# Patient Record
Sex: FEMALE | Race: WHITE | NOT HISPANIC OR LATINO | URBAN - METROPOLITAN AREA
[De-identification: names, ages, dates, MRNs, and addresses within clinical notes are randomized per-mention and may not be internally consistent; named-entity substitution may affect disease eponyms.]

---

## 2023-02-18 PROBLEM — C50.111 MALIGNANT NEOPLASM OF CENTRAL PORTION OF RIGHT BREAST IN FEMALE, ESTROGEN RECEPTOR NEGATIVE: Status: ACTIVE | Noted: 2021-08-23

## 2023-02-18 PROBLEM — Z17.1 MALIGNANT NEOPLASM OF CENTRAL PORTION OF RIGHT BREAST IN FEMALE, ESTROGEN RECEPTOR NEGATIVE: Status: ACTIVE | Noted: 2021-08-23

## 2023-02-18 NOTE — PROGRESS NOTES
Breast Surgery Clinic Note    Chief Complaint:   Bernadette Longoria is a 82 y.o. female presenting today for her three month follow-up of her breast cancer issues. She is due for a physical examination and mammogram. (# 2 of 3) She has had no issues in the interim.    History of Present Illness:   Mrs. 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 One SLN was negative prior to neoadjuvant chemotherapy. She completed neoadjuvant chemotherapy. 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  Margins for the invasive component were widely negative.  DCIS margins were 0.1 mm superiorly.  ypT2N0. She completed adjuvant radiation.  MD::: Paul Villavicencio MD; Paul Mueller MD; Dior Duong MD; Alberto Fuentes MD; Aurelia Gomes MD    Past Medical History:   Diagnosis Date    Anxiety     Hypercholesteremia     Hypertension     Hypothyroidism     Insulin resistance     Malignant neoplasm of central portion of right breast in female, estrogen receptor negative 08/23/2021    Last Assessment & Plan:  Formatting of this note might be different from the original. History & Physical Undergoing neoadjuvant chemotherapy with Dr. Fuentes as noted above.  Her last round was on 11/10/2021 a full dose cyclophosphamide, Taxotere and Keytruda. Discharge Summary   Follow-up Follow-up with Dr. Fuentes.    Osteopenia 12/14/2020    [facility]      Past Surgical History:   Procedure Laterality Date    BREAST BIOPSY Right 08/10/2021    invasive carcinoma    BREAST BIOPSY Left     BREAST BIOPSY Right     X 2    CARDIAC CATHETERIZATION  05/21/2014    LUMPECTOMY, BREAST Right 01/20/2022    ROTATOR CUFF REPAIR      sentinal node biopsy & port placement  09/02/2021    TOTAL ABDOMINAL HYSTERECTOMY W/ BILATERAL SALPINGOOPHORECTOMY  1983        Current Outpatient Medications:     ALPRAZolam (XANAX) 0.25 MG tablet, TAKE ONE  "TABLET BY MOUTH NIGHTLY AS NEEDED for sleep THANK YOU, Disp: , Rfl:     aspirin (ECOTRIN) 81 MG EC tablet, TAKE ONE TABLET BY MOUTH ONCE DAILY THANK YOU, Disp: , Rfl:     EScitalopram oxalate (LEXAPRO) 10 MG tablet, TAKE ONE TABLET BY MOUTH IN THE MORNING THANK YOU, Disp: , Rfl:     furosemide (LASIX) 20 MG tablet, TAKE ONE TABLET BY MOUTH ONCE DAILY AS NEEDED FOR SWELLING THANK YOU, Disp: , Rfl:     gabapentin (NEURONTIN) 300 MG capsule, TAKE ONE CAPSULE BY MOUTH THREE TIMES DAILY THANK YOU, Disp: , Rfl:     levothyroxine (SYNTHROID) 88 MCG tablet, TAKE ONE TABLET BY MOUTH ONCE DAILY THANK YOU, Disp: , Rfl:     lisinopriL (PRINIVIL,ZESTRIL) 2.5 MG tablet, Take 2.5 mg by mouth., Disp: , Rfl:     meloxicam (MOBIC) 7.5 MG tablet, TAKE ONE TABLET BY MOUTH EVERY MORNING WITH FOOD AS NEEDED FOR JOINT STIFFNESS, Disp: , Rfl:     midodrine (PROAMATINE) 2.5 MG Tab, TAKE ONE TABLET BY MOUTH THREE TIMES DAILY THANK YOU, Disp: , Rfl:     rosuvastatin (CRESTOR) 10 MG tablet, TAKE ONE TABLET BY MOUTH ONCE IN THE EVENING THANK YOU, Disp: , Rfl:     tamsulosin (FLOMAX) 0.4 mg Cap, TAKE ONE CAPSULE BY MOUTH DAILY THANK YOU, Disp: , Rfl:     traMADoL (ULTRAM) 50 mg tablet, TAKE ONE TABLET BY MOUTH TWICE DAILY AS NEEDED FOR PAIN THANK YOU, Disp: , Rfl:     valsartan (DIOVAN) 40 MG tablet, TAKE ONE TABLET BY MOUTH ONCE DAILY THANK YOU, Disp: , Rfl:    Review of patient's allergies indicates:   Allergen Reactions    Prednisone Other (See Comments)     Numbness in Mouth     Hydrocodone-homatropine     Nabumetone     Penicillins      "didn't dissolve under the skin" over 60yrs ago      Propoxyphene Other (See Comments)    Sulfa (sulfonamide antibiotics)       Social History     Tobacco Use    Smoking status: Never    Smokeless tobacco: Never   Substance Use Topics    Alcohol use: Yes      Family History   Problem Relation Age of Onset    Lung cancer Mother     Diabetes Father       Review of Systems   Genitourinary:  Negative for hot " flashes.   Integumentary:  Negative for color change, rash, mole/lesion, breast mass, breast discharge and breast tenderness.   Breast: Negative for mass and tenderness   Physical Exam  Exam conducted with a chaperone present.   Constitutional:       Appearance: Normal appearance.   HENT:      Head: Normocephalic.      Nose: Nose normal.   Eyes:      Conjunctiva/sclera: Conjunctivae normal.   Cardiovascular:      Rate and Rhythm: Normal rate and regular rhythm.   Pulmonary:      Effort: Pulmonary effort is normal.   Chest:   Breasts:     Right: Normal. No swelling, mass, nipple discharge or skin change.      Left: No swelling, mass, nipple discharge or skin change.   Abdominal:      General: Abdomen is flat.      Palpations: Abdomen is soft.   Musculoskeletal:      Cervical back: Normal range of motion and neck supple.   Lymphadenopathy:      Cervical: No cervical adenopathy.      Right cervical: No deep or posterior cervical adenopathy.     Left cervical: No superficial, deep or posterior cervical adenopathy.      Upper Body:      Right upper body: No supraclavicular or axillary adenopathy.      Left upper body: No supraclavicular or axillary adenopathy.   Skin:     General: Skin is warm.   Neurological:      Mental Status: She is alert and oriented to person, place, and time.   Psychiatric:         Mood and Affect: Mood normal.      MAMMOGRAM REPORT: She has some diffuse fibronodular tissue bilaterally; there are no spiculated lesions, distortions or suspicious calcifications noted; NEM    1. Malignant neoplasm of central portion of right breast in female, estrogen receptor negative    She is doing well from a cancer standpoint and has no worrisome findings today.  She will continue to be followed according to NCCN Guidelines.     2. Screening breast examination     She understands that her imaging and exams have remained stable and is comfortable being followed in a conservative fashion. She understands the  importance of monthly self-breast examination and knows to report any and all changes as they occur.     Medical Decision Making:    t is my impression that the patient suffers from all the listed conditions.  Each of these conditions did affect my Plan of Care and all medical decisions that were rendered.  The medical decision making was of high difficulty based on her comorbidities and her previous diagnosis of Triple Negative breast cancer, which can lead to an increased recurrence rate and pose a direct threat to her life.  Laboratory evaluations are currently pending but will be reviewed in the next 24 hours. Any further recommendations will be communicated to the patient by me.  I have reviewed and verified her allergies, list of medications, medical and surgical histories, social history, and a pertinent review of symptoms.    Follow up:  3 months and prn (PE)

## 2023-02-24 DIAGNOSIS — C50.111 MALIGNANT NEOPLASM OF CENTRAL PORTION OF RIGHT BREAST IN FEMALE, ESTROGEN RECEPTOR NEGATIVE: Primary | ICD-10-CM

## 2023-02-24 DIAGNOSIS — Z17.1 MALIGNANT NEOPLASM OF CENTRAL PORTION OF RIGHT BREAST IN FEMALE, ESTROGEN RECEPTOR NEGATIVE: Primary | ICD-10-CM

## 2023-02-28 ENCOUNTER — OFFICE VISIT (OUTPATIENT)
Dept: SURGERY | Facility: CLINIC | Age: 83
End: 2023-02-28
Payer: MEDICARE

## 2023-02-28 VITALS — BODY MASS INDEX: 29.44 KG/M2 | WEIGHT: 160 LBS | HEIGHT: 62 IN

## 2023-02-28 DIAGNOSIS — C50.111 MALIGNANT NEOPLASM OF CENTRAL PORTION OF RIGHT BREAST IN FEMALE, ESTROGEN RECEPTOR NEGATIVE: Primary | ICD-10-CM

## 2023-02-28 DIAGNOSIS — Z12.39 SCREENING BREAST EXAMINATION: ICD-10-CM

## 2023-02-28 DIAGNOSIS — Z17.1 MALIGNANT NEOPLASM OF CENTRAL PORTION OF RIGHT BREAST IN FEMALE, ESTROGEN RECEPTOR NEGATIVE: Primary | ICD-10-CM

## 2023-02-28 PROCEDURE — 99214 OFFICE O/P EST MOD 30 MIN: CPT | Mod: S$GLB,,, | Performed by: SPECIALIST

## 2023-02-28 PROCEDURE — 85025 COMPLETE CBC W/AUTO DIFF WBC: CPT | Performed by: SPECIALIST

## 2023-02-28 PROCEDURE — 80053 COMPREHEN METABOLIC PANEL: CPT | Performed by: SPECIALIST

## 2023-02-28 PROCEDURE — 83615 LACTATE (LD) (LDH) ENZYME: CPT | Performed by: SPECIALIST

## 2023-02-28 PROCEDURE — 82378 CARCINOEMBRYONIC ANTIGEN: CPT | Performed by: SPECIALIST

## 2023-02-28 PROCEDURE — 99214 PR OFFICE/OUTPT VISIT, EST, LEVL IV, 30-39 MIN: ICD-10-PCS | Mod: S$GLB,,, | Performed by: SPECIALIST

## 2023-03-01 LAB
ALBUMIN SERPL BCP-MCNC: 4.2 G/DL (ref 3.5–5.2)
ALP SERPL-CCNC: 87 U/L (ref 55–135)
ALT SERPL W/O P-5'-P-CCNC: 12 U/L (ref 10–44)
ANION GAP SERPL CALC-SCNC: 14 MMOL/L (ref 8–16)
AST SERPL-CCNC: 21 U/L (ref 10–40)
BASOPHILS # BLD AUTO: 0.03 K/UL (ref 0–0.2)
BASOPHILS NFR BLD: 0.4 % (ref 0–1.9)
BILIRUB SERPL-MCNC: 1.2 MG/DL (ref 0.1–1)
BUN SERPL-MCNC: 34 MG/DL (ref 8–23)
CALCIUM SERPL-MCNC: 9.9 MG/DL (ref 8.7–10.5)
CEA SERPL-MCNC: 2.1 NG/ML (ref 0–5)
CHLORIDE SERPL-SCNC: 103 MMOL/L (ref 95–110)
CO2 SERPL-SCNC: 24 MMOL/L (ref 23–29)
CREAT SERPL-MCNC: 1.5 MG/DL (ref 0.5–1.4)
DIFFERENTIAL METHOD: ABNORMAL
EOSINOPHIL # BLD AUTO: 0 K/UL (ref 0–0.5)
EOSINOPHIL NFR BLD: 0.5 % (ref 0–8)
ERYTHROCYTE [DISTWIDTH] IN BLOOD BY AUTOMATED COUNT: 13.6 % (ref 11.5–14.5)
EST. GFR  (NO RACE VARIABLE): 35 ML/MIN/1.73 M^2
GLUCOSE SERPL-MCNC: 139 MG/DL (ref 70–110)
HCT VFR BLD AUTO: 37.7 % (ref 37–48.5)
HGB BLD-MCNC: 12.5 G/DL (ref 12–16)
IMM GRANULOCYTES # BLD AUTO: 0.01 K/UL (ref 0–0.04)
IMM GRANULOCYTES NFR BLD AUTO: 0.1 % (ref 0–0.5)
LDH SERPL L TO P-CCNC: 195 U/L (ref 110–260)
LYMPHOCYTES # BLD AUTO: 0.9 K/UL (ref 1–4.8)
LYMPHOCYTES NFR BLD: 12.1 % (ref 18–48)
MCH RBC QN AUTO: 31.2 PG (ref 27–31)
MCHC RBC AUTO-ENTMCNC: 33.2 G/DL (ref 32–36)
MCV RBC AUTO: 94 FL (ref 82–98)
MONOCYTES # BLD AUTO: 0.4 K/UL (ref 0.3–1)
MONOCYTES NFR BLD: 4.7 % (ref 4–15)
NEUTROPHILS # BLD AUTO: 6.3 K/UL (ref 1.8–7.7)
NEUTROPHILS NFR BLD: 82.2 % (ref 38–73)
NRBC BLD-RTO: 0 /100 WBC
PLATELET # BLD AUTO: 281 K/UL (ref 150–450)
PMV BLD AUTO: 10.8 FL (ref 9.2–12.9)
POTASSIUM SERPL-SCNC: 4.1 MMOL/L (ref 3.5–5.1)
PROT SERPL-MCNC: 7.5 G/DL (ref 6–8.4)
RBC # BLD AUTO: 4.01 M/UL (ref 4–5.4)
SODIUM SERPL-SCNC: 141 MMOL/L (ref 136–145)
WBC # BLD AUTO: 7.63 K/UL (ref 3.9–12.7)

## 2023-03-02 LAB — CANCER AG27-29 SERPL-ACNC: 22.1 U/ML

## 2023-05-19 NOTE — PROGRESS NOTES
[facility]  Paul Joiner MD, FACS  Shanel Christiansen, LISSETT-C      Chief Complaint:   Bernadette Longoria is a 82 y.o. female presenting today for her 3 month follow up to her breast cancer diagnosis.  She is due for a Physical Examination.  She reports no interval changes.     History of Present Illness:   Mrs. 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 One SLN was negative prior to neoadjuvant chemotherapy. She completed neoadjuvant chemotherapy. 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  Margins for the invasive component were widely negative.  DCIS margins were 0.1 mm superiorly.  ypT2N0. She completed adjuvant radiation.  MD::: Paul Villavicencio MD; Paul Mueller MD; Dior Duong MD; Alberto Fuentes MD; MD Eliseo    Past Medical History:   Diagnosis Date    Anxiety     Hypercholesteremia     Hypertension     Hypothyroidism     Insulin resistance     Malignant neoplasm of central portion of right breast in female, estrogen receptor negative 08/23/2021    Last Assessment & Plan:  Formatting of this note might be different from the original. History & Physical Undergoing neoadjuvant chemotherapy with Dr. Fuentes as noted above.  Her last round was on 11/10/2021 a full dose cyclophosphamide, Taxotere and Keytruda. Discharge Summary   Follow-up Follow-up with Dr. Fuentes.    Osteopenia 12/14/2020    [facility]      Past Surgical History:   Procedure Laterality Date    BREAST BIOPSY Right 08/10/2021    invasive carcinoma    BREAST BIOPSY Left     BREAST BIOPSY Right     X 2    CARDIAC CATHETERIZATION  05/21/2014    LUMPECTOMY, BREAST Right 01/20/2022    ROTATOR CUFF REPAIR      sentinal node biopsy & port placement  09/02/2021    TOTAL ABDOMINAL HYSTERECTOMY W/ BILATERAL SALPINGOOPHORECTOMY  1983        Current Outpatient Medications:     ALPRAZolam  "(XANAX) 0.25 MG tablet, TAKE ONE TABLET BY MOUTH NIGHTLY AS NEEDED for sleep THANK YOU, Disp: , Rfl:     aspirin (ECOTRIN) 81 MG EC tablet, TAKE ONE TABLET BY MOUTH ONCE DAILY THANK YOU, Disp: , Rfl:     EScitalopram oxalate (LEXAPRO) 10 MG tablet, TAKE ONE TABLET BY MOUTH IN THE MORNING THANK YOU, Disp: , Rfl:     furosemide (LASIX) 20 MG tablet, TAKE ONE TABLET BY MOUTH ONCE DAILY AS NEEDED FOR SWELLING THANK YOU, Disp: , Rfl:     gabapentin (NEURONTIN) 300 MG capsule, TAKE ONE CAPSULE BY MOUTH THREE TIMES DAILY THANK YOU, Disp: , Rfl:     levothyroxine (SYNTHROID) 88 MCG tablet, TAKE ONE TABLET BY MOUTH ONCE DAILY THANK YOU, Disp: , Rfl:     lisinopriL (PRINIVIL,ZESTRIL) 2.5 MG tablet, Take 2.5 mg by mouth., Disp: , Rfl:     meloxicam (MOBIC) 7.5 MG tablet, TAKE ONE TABLET BY MOUTH EVERY MORNING WITH FOOD AS NEEDED FOR JOINT STIFFNESS, Disp: , Rfl:     midodrine (PROAMATINE) 2.5 MG Tab, 10 mg., Disp: , Rfl:     rosuvastatin (CRESTOR) 10 MG tablet, TAKE ONE TABLET BY MOUTH ONCE IN THE EVENING THANK YOU, Disp: , Rfl:     tamsulosin (FLOMAX) 0.4 mg Cap, TAKE ONE CAPSULE BY MOUTH DAILY THANK YOU, Disp: , Rfl:     traMADoL (ULTRAM) 50 mg tablet, TAKE ONE TABLET BY MOUTH TWICE DAILY AS NEEDED FOR PAIN THANK YOU, Disp: , Rfl:     valsartan (DIOVAN) 40 MG tablet, TAKE ONE TABLET BY MOUTH ONCE DAILY THANK YOU, Disp: , Rfl:    Review of patient's allergies indicates:   Allergen Reactions    Prednisone Other (See Comments)     Numbness in Mouth     Hydrocodone-homatropine     Nabumetone     Penicillins      "didn't dissolve under the skin" over 60yrs ago      Propoxyphene Other (See Comments)    Sulfa (sulfonamide antibiotics)       Social History     Tobacco Use    Smoking status: Never    Smokeless tobacco: Never   Substance Use Topics    Alcohol use: Yes      Family History   Problem Relation Age of Onset    Lung cancer Mother     Diabetes Father         Review of Systems   Integumentary:  Negative for color change, " rash, mole/lesion, breast mass, breast discharge and breast tenderness.   Breast: Negative for mass and tenderness     Physical Exam   Constitutional: She is cooperative.   HENT:   Head: Normocephalic.   Pulmonary/Chest: She exhibits no mass. Right breast exhibits no inverted nipple, no mass, no nipple discharge, no skin change and no tenderness. Left breast exhibits no inverted nipple, no mass, no nipple discharge, no skin change and no tenderness.   Abdominal: Normal appearance.   Musculoskeletal: Lymphadenopathy:      Upper Body:      Right upper body: No supraclavicular or axillary adenopathy.      Left upper body: No supraclavicular or axillary adenopathy.     Neurological: She is alert.   Skin: No rash noted.        ASSESSMENT and PLAN of CARE    1. Malignant neoplasm of central portion of right breast in female, estrogen receptor negative  Assessment & Plan:   Patient is doing well and is being followed according to NCCN Guidelines. She understands that her imaging and exams have remained stable and is comfortable being followed in a conservative fashion. She understands the importance of monthly self-breast examination and knows to report any and all changes as they occur.    Labs obtained today: CBC, Chem 12, CEA, LDH, CA27/29 - after resulted, these will be compared to her previous values and all abnormal values will be phoned to her for discussion.        Medical Decision Making:  It is my impression that the patient suffers from all the listed conditions.  Each of these conditions did affect my Plan of Care and all medical decisions that were rendered.  The medical decision making was of high difficulty based on her comorbidities and her previous diagnosis of Triple Negative breast cancer, which can lead to an increased recurrence rate and pose a direct threat to her life.  Laboratory evaluations are currently pending but will be reviewed in the next 24 hours. Any further recommendations will be  communicated to the patient by me.  I have reviewed and verified her allergies, list of medications, medical and surgical histories, social history, and a pertinent review of symptoms.    Follow up:  3 Months and PRN     For:PEETHAN (VALERIA) at [facility], CXR, and LABS    No orders of the defined types were placed in this encounter.

## 2023-05-29 ENCOUNTER — OFFICE VISIT (OUTPATIENT)
Dept: SURGERY | Facility: CLINIC | Age: 83
End: 2023-05-29
Payer: MEDICARE

## 2023-05-29 DIAGNOSIS — Z17.1 MALIGNANT NEOPLASM OF CENTRAL PORTION OF RIGHT BREAST IN FEMALE, ESTROGEN RECEPTOR NEGATIVE: ICD-10-CM

## 2023-05-29 DIAGNOSIS — C50.111 MALIGNANT NEOPLASM OF CENTRAL PORTION OF RIGHT BREAST IN FEMALE, ESTROGEN RECEPTOR NEGATIVE: ICD-10-CM

## 2023-05-29 LAB
ALBUMIN SERPL BCP-MCNC: 4.1 G/DL (ref 3.5–5.2)
ALP SERPL-CCNC: 114 U/L (ref 55–135)
ALT SERPL W/O P-5'-P-CCNC: 12 U/L (ref 10–44)
ANION GAP SERPL CALC-SCNC: 16 MMOL/L (ref 8–16)
AST SERPL-CCNC: 23 U/L (ref 10–40)
BASOPHILS # BLD AUTO: 0.05 K/UL (ref 0–0.2)
BASOPHILS NFR BLD: 0.7 % (ref 0–1.9)
BILIRUB SERPL-MCNC: 1.3 MG/DL (ref 0.1–1)
BUN SERPL-MCNC: 33 MG/DL (ref 8–23)
CALCIUM SERPL-MCNC: 9.9 MG/DL (ref 8.7–10.5)
CEA SERPL-MCNC: 2.8 NG/ML (ref 0–5)
CHLORIDE SERPL-SCNC: 101 MMOL/L (ref 95–110)
CO2 SERPL-SCNC: 22 MMOL/L (ref 23–29)
CREAT SERPL-MCNC: 1.3 MG/DL (ref 0.5–1.4)
DIFFERENTIAL METHOD: ABNORMAL
EOSINOPHIL # BLD AUTO: 0.1 K/UL (ref 0–0.5)
EOSINOPHIL NFR BLD: 1.4 % (ref 0–8)
ERYTHROCYTE [DISTWIDTH] IN BLOOD BY AUTOMATED COUNT: 13.6 % (ref 11.5–14.5)
EST. GFR  (NO RACE VARIABLE): 41.3 ML/MIN/1.73 M^2
GLUCOSE SERPL-MCNC: 143 MG/DL (ref 70–110)
HCT VFR BLD AUTO: 44.8 % (ref 37–48.5)
HGB BLD-MCNC: 14.4 G/DL (ref 12–16)
IMM GRANULOCYTES # BLD AUTO: 0.05 K/UL (ref 0–0.04)
IMM GRANULOCYTES NFR BLD AUTO: 0.7 % (ref 0–0.5)
LDH SERPL L TO P-CCNC: 268 U/L (ref 110–260)
LYMPHOCYTES # BLD AUTO: 1.2 K/UL (ref 1–4.8)
LYMPHOCYTES NFR BLD: 16.7 % (ref 18–48)
MCH RBC QN AUTO: 30.3 PG (ref 27–31)
MCHC RBC AUTO-ENTMCNC: 32.1 G/DL (ref 32–36)
MCV RBC AUTO: 94 FL (ref 82–98)
MONOCYTES # BLD AUTO: 0.5 K/UL (ref 0.3–1)
MONOCYTES NFR BLD: 7.2 % (ref 4–15)
NEUTROPHILS # BLD AUTO: 5.2 K/UL (ref 1.8–7.7)
NEUTROPHILS NFR BLD: 73.3 % (ref 38–73)
NRBC BLD-RTO: 0 /100 WBC
PLATELET # BLD AUTO: 270 K/UL (ref 150–450)
PLATELET BLD QL SMEAR: ABNORMAL
PMV BLD AUTO: 11.4 FL (ref 9.2–12.9)
POTASSIUM SERPL-SCNC: 4.1 MMOL/L (ref 3.5–5.1)
PROT SERPL-MCNC: 7.7 G/DL (ref 6–8.4)
RBC # BLD AUTO: 4.76 M/UL (ref 4–5.4)
SODIUM SERPL-SCNC: 139 MMOL/L (ref 136–145)
WBC # BLD AUTO: 7.12 K/UL (ref 3.9–12.7)

## 2023-05-29 PROCEDURE — 99214 OFFICE O/P EST MOD 30 MIN: CPT | Mod: S$GLB,,, | Performed by: SPECIALIST

## 2023-05-29 PROCEDURE — 82378 CARCINOEMBRYONIC ANTIGEN: CPT | Performed by: SPECIALIST

## 2023-05-29 PROCEDURE — 99214 PR OFFICE/OUTPT VISIT, EST, LEVL IV, 30-39 MIN: ICD-10-PCS | Mod: S$GLB,,, | Performed by: SPECIALIST

## 2023-05-29 PROCEDURE — 83615 LACTATE (LD) (LDH) ENZYME: CPT | Performed by: SPECIALIST

## 2023-05-29 PROCEDURE — 85025 COMPLETE CBC W/AUTO DIFF WBC: CPT | Performed by: SPECIALIST

## 2023-05-29 PROCEDURE — 80053 COMPREHEN METABOLIC PANEL: CPT | Performed by: SPECIALIST

## 2023-05-29 RX ORDER — MIDODRINE HYDROCHLORIDE 5 MG/1
TABLET ORAL
COMMUNITY
Start: 2023-05-01

## 2023-05-29 RX ORDER — FLUTICASONE PROPIONATE 50 MCG
1 SPRAY, SUSPENSION (ML) NASAL EVERY MORNING
COMMUNITY
Start: 2023-05-10

## 2023-05-31 LAB — CANCER AG27-29 SERPL-ACNC: 21.4 U/ML

## 2023-08-18 DIAGNOSIS — C50.111 MALIGNANT NEOPLASM OF CENTRAL PORTION OF RIGHT BREAST IN FEMALE, ESTROGEN RECEPTOR NEGATIVE: Primary | ICD-10-CM

## 2023-08-18 DIAGNOSIS — Z17.1 MALIGNANT NEOPLASM OF CENTRAL PORTION OF RIGHT BREAST IN FEMALE, ESTROGEN RECEPTOR NEGATIVE: Primary | ICD-10-CM

## 2023-08-18 NOTE — PROGRESS NOTES
[facility]  MD Shanel Pritchard, NP-C    Chief Complaint:   Bernadette Longoria is a 82 y.o. female presenting today for  3 month follow up for her breast cancer issues. She is due for Physical Examination, Mammogram, and Chest Xray  She reports no interval changes on her self-breast examination.     History of Present Illness:   Mrs. 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 One SLN was negative prior to neoadjuvant chemotherapy. She completed neoadjuvant chemotherapy. 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  Margins for the invasive component were widely negative.  DCIS margins were 0.1 mm superiorly.  ypT2N0. She completed adjuvant radiation.  MD::: Paul Villavicencio MD; Paul Mueller MD;  Alberto Fuentes MD; MD Eliseo    Past Medical History:   Diagnosis Date    Abnormal chest x-ray 8/28/2023    Anxiety     Hypercholesteremia     Hypertension     Hypothyroidism     Insulin resistance     Malignant neoplasm of central portion of right breast in female, estrogen receptor negative 08/23/2021    Last Assessment & Plan:  Formatting of this note might be different from the original. History & Physical Undergoing neoadjuvant chemotherapy with Dr. Fuentes as noted above.  Her last round was on 11/10/2021 a full dose cyclophosphamide, Taxotere and Keytruda. Discharge Summary   Follow-up Follow-up with Dr. Fuentes.    Osteopenia 12/14/2020    [facility]      Past Surgical History:   Procedure Laterality Date    BREAST BIOPSY Left     BREAST BIOPSY Right     X 2    BREAST LUMPECTOMY Right 08/2021    CARDIAC CATHETERIZATION  05/21/2014    LUMPECTOMY, BREAST Right 01/20/2022    ROTATOR CUFF REPAIR      sentinal node biopsy & port placement  09/02/2021    TOTAL ABDOMINAL HYSTERECTOMY W/ BILATERAL SALPINGOOPHORECTOMY  1983        Current  "Outpatient Medications:     ALPRAZolam (XANAX) 0.25 MG tablet, TAKE ONE TABLET BY MOUTH NIGHTLY AS NEEDED for sleep THANK YOU, Disp: , Rfl:     aspirin (ECOTRIN) 81 MG EC tablet, TAKE ONE TABLET BY MOUTH ONCE DAILY THANK YOU, Disp: , Rfl:     EScitalopram oxalate (LEXAPRO) 10 MG tablet, TAKE ONE TABLET BY MOUTH IN THE MORNING THANK YOU, Disp: , Rfl:     fluticasone propionate (FLONASE) 50 mcg/actuation nasal spray, 1 spray by Each Nostril route every morning., Disp: , Rfl:     furosemide (LASIX) 20 MG tablet, TAKE ONE TABLET BY MOUTH ONCE DAILY AS NEEDED FOR SWELLING THANK YOU, Disp: , Rfl:     gabapentin (NEURONTIN) 300 MG capsule, TAKE ONE CAPSULE BY MOUTH THREE TIMES DAILY THANK YOU, Disp: , Rfl:     levothyroxine (SYNTHROID) 88 MCG tablet, TAKE ONE TABLET BY MOUTH ONCE DAILY THANK YOU, Disp: , Rfl:     lisinopriL (PRINIVIL,ZESTRIL) 2.5 MG tablet, Take 2.5 mg by mouth., Disp: , Rfl:     midodrine (PROAMATINE) 2.5 MG Tab, 10 mg., Disp: , Rfl:     midodrine (PROAMATINE) 5 MG Tab, Take by mouth., Disp: , Rfl:     rosuvastatin (CRESTOR) 10 MG tablet, TAKE ONE TABLET BY MOUTH ONCE IN THE EVENING THANK YOU, Disp: , Rfl:     tamsulosin (FLOMAX) 0.4 mg Cap, TAKE ONE CAPSULE BY MOUTH DAILY THANK YOU, Disp: , Rfl:     traMADoL (ULTRAM) 50 mg tablet, TAKE ONE TABLET BY MOUTH TWICE DAILY AS NEEDED FOR PAIN THANK YOU, Disp: , Rfl:     valsartan (DIOVAN) 40 MG tablet, TAKE ONE TABLET BY MOUTH ONCE DAILY THANK YOU, Disp: , Rfl:     meloxicam (MOBIC) 7.5 MG tablet, TAKE ONE TABLET BY MOUTH EVERY MORNING WITH FOOD AS NEEDED FOR JOINT STIFFNESS, Disp: , Rfl:    Review of patient's allergies indicates:   Allergen Reactions    Prednisone Other (See Comments)     Numbness in Mouth     Hydrocodone-homatropine     Nabumetone     Penicillins      "didn't dissolve under the skin" over 60yrs ago      Propoxyphene Other (See Comments)    Sulfa (sulfonamide antibiotics)       Social History     Tobacco Use    Smoking status: Never    " Smokeless tobacco: Never   Substance Use Topics    Alcohol use: Yes      Family History   Problem Relation Age of Onset    Lung cancer Mother     Diabetes Father         Review of Systems   Integumentary:  Negative for color change, rash, mole/lesion, breast mass, breast discharge and breast tenderness.   Breast: Negative for mass and tenderness       Physical Exam   Constitutional: She is cooperative.   HENT:   Head: Normocephalic.   Pulmonary/Chest: She exhibits no mass. Right breast exhibits no inverted nipple, no mass, no nipple discharge, no skin change and no tenderness. Left breast exhibits no inverted nipple, no mass, no nipple discharge, no skin change and no tenderness.   Abdominal: Normal appearance.   Musculoskeletal: Lymphadenopathy:      Upper Body:      Right upper body: No supraclavicular or axillary adenopathy.      Left upper body: No supraclavicular or axillary adenopathy.     Neurological: She is alert.   Skin: No rash noted.      MAMMOGRAM REPORT: The breasts have scattered areas of fibroglandular density. There is no evidence of suspicious masses, calcifications, or other abnormal findings.  Stable postoperative changes right breast. There is no mammographic evidence of malignancy.     CXR REPORT: There has been development of 2.4 cm nodular focus in the posterior left upper lobe. No pleural fluid. The cardiac silhouette is within normal size limits. The descending thoracic aorta is mildly tortuous. Stable superior endplate height loss of L1.  Impression: Development of a 2.4 cm nodular focus in the posterior left upper lobe, concerning for a pulmonary nodular metastasis. Chest CT with contrast is recommended for further assessment.    NOTE:::We viewed her films together at today's visit.  We discussed the multiple views obtained and the important findings.  Even benign changes were mentioned and her questions were answered.  She knows that she may receive a formal letter or report from the  Radiologist.  She is to contact us if she has questions.        Assessment/Plan  1. Malignant neoplasm of central portion of right breast in female, estrogen receptor negative  Assessment & Plan:  MAMMOGRAM REPORT: She has some diffuse fibronodular tissue; there are no spiculated lesions, distortions or suspicious calcifications noted; NEM    NOTE:::We viewed her films together at today's visit.  We discussed the multiple views obtained and the important findings.  Even benign changes were mentioned and her questions were answered.  She knows that she may receive a formal letter or report from the Radiologist.  She is to contact us if she has questions.        Orders:  -     Lactate Dehydrogenase  -     Comprehensive Metabolic Panel  -     CEA  -     CBC Auto Differential  -     Cancer Antigen 27-29    2. Abnormal chest x-ray  Assessment & Plan:  I will obtain a CT as recommended.              Medical Decision Making:  It is my impression that this patient suffers all conditions contained in this medical document.  Each of these conditions did affect our plan of care and my medical decision making today.  It is my opinion that the medical decision making concerning this patient was of moderate difficulty based on the aforementioned conditions.  Any further recommendations will be communicated to the patient by me.  I have reviewed and verified her allergies, list of medications, medical and surgical histories, social history, and a pertinent review of symptoms.      Follow up:  3 months and prn    For:  Physical Examination    Addendum 8/29/23 1122: CT Chest  Impression: a mass in the superior segment of the left lower lobe may represent a metastasis or possibly a primary lung carcinoma. Biopsy is recommended for further assessment. Dr. Fuentes has notified patient and ordered a CT guided biopsy.              9/20/2023 1:03 PM ADDENDUM:  CT scan of the chest did show a worrisome mass that at core biopsy was consistent  with a metastatic carcinoma of the breast we will await Dr. Fuentes's treatment plan and are available as necessary to assist.

## 2023-08-18 NOTE — ASSESSMENT & PLAN NOTE
MAMMOGRAM REPORT: She has some diffuse fibronodular tissue; there are no spiculated lesions, distortions or suspicious calcifications noted; NEM    NOTE:::We viewed her films together at today's visit.  We discussed the multiple views obtained and the important findings.  Even benign changes were mentioned and her questions were answered.  She knows that she may receive a formal letter or report from the Radiologist.  She is to contact us if she has questions.

## 2023-08-28 ENCOUNTER — OFFICE VISIT (OUTPATIENT)
Dept: SURGERY | Facility: CLINIC | Age: 83
End: 2023-08-28
Payer: MEDICARE

## 2023-08-28 DIAGNOSIS — R93.89 ABNORMAL CHEST X-RAY: ICD-10-CM

## 2023-08-28 DIAGNOSIS — Z17.1 MALIGNANT NEOPLASM OF CENTRAL PORTION OF RIGHT BREAST IN FEMALE, ESTROGEN RECEPTOR NEGATIVE: Primary | ICD-10-CM

## 2023-08-28 DIAGNOSIS — C50.111 MALIGNANT NEOPLASM OF CENTRAL PORTION OF RIGHT BREAST IN FEMALE, ESTROGEN RECEPTOR NEGATIVE: Primary | ICD-10-CM

## 2023-08-28 PROCEDURE — 83615 LACTATE (LD) (LDH) ENZYME: CPT | Performed by: NURSE PRACTITIONER

## 2023-08-28 PROCEDURE — 99214 PR OFFICE/OUTPT VISIT, EST, LEVL IV, 30-39 MIN: ICD-10-PCS | Mod: S$GLB,,, | Performed by: NURSE PRACTITIONER

## 2023-08-28 PROCEDURE — 80053 COMPREHEN METABOLIC PANEL: CPT | Performed by: NURSE PRACTITIONER

## 2023-08-28 PROCEDURE — 99214 OFFICE O/P EST MOD 30 MIN: CPT | Mod: S$GLB,,, | Performed by: NURSE PRACTITIONER

## 2023-08-28 PROCEDURE — 85025 COMPLETE CBC W/AUTO DIFF WBC: CPT | Performed by: NURSE PRACTITIONER

## 2023-08-28 PROCEDURE — 82378 CARCINOEMBRYONIC ANTIGEN: CPT | Performed by: NURSE PRACTITIONER

## 2023-08-29 LAB
ALBUMIN SERPL BCP-MCNC: 4.5 G/DL (ref 3.5–5.2)
ALP SERPL-CCNC: 104 U/L (ref 55–135)
ALT SERPL W/O P-5'-P-CCNC: 15 U/L (ref 10–44)
ANION GAP SERPL CALC-SCNC: 15 MMOL/L (ref 8–16)
AST SERPL-CCNC: 23 U/L (ref 10–40)
BASOPHILS # BLD AUTO: 0.04 K/UL (ref 0–0.2)
BASOPHILS NFR BLD: 0.6 % (ref 0–1.9)
BILIRUB SERPL-MCNC: 1.3 MG/DL (ref 0.1–1)
BUN SERPL-MCNC: 23 MG/DL (ref 8–23)
CALCIUM SERPL-MCNC: 10.4 MG/DL (ref 8.7–10.5)
CEA SERPL-MCNC: 2.7 NG/ML (ref 0–5)
CHLORIDE SERPL-SCNC: 106 MMOL/L (ref 95–110)
CO2 SERPL-SCNC: 20 MMOL/L (ref 23–29)
CREAT SERPL-MCNC: 1.2 MG/DL (ref 0.5–1.4)
DIFFERENTIAL METHOD: ABNORMAL
EOSINOPHIL # BLD AUTO: 0.1 K/UL (ref 0–0.5)
EOSINOPHIL NFR BLD: 1.1 % (ref 0–8)
ERYTHROCYTE [DISTWIDTH] IN BLOOD BY AUTOMATED COUNT: 14.2 % (ref 11.5–14.5)
EST. GFR  (NO RACE VARIABLE): 45.5 ML/MIN/1.73 M^2
GLUCOSE SERPL-MCNC: 139 MG/DL (ref 70–110)
HCT VFR BLD AUTO: 45.1 % (ref 37–48.5)
HGB BLD-MCNC: 14.3 G/DL (ref 12–16)
IMM GRANULOCYTES # BLD AUTO: 0.01 K/UL (ref 0–0.04)
IMM GRANULOCYTES NFR BLD AUTO: 0.1 % (ref 0–0.5)
LDH SERPL L TO P-CCNC: 227 U/L (ref 110–260)
LYMPHOCYTES # BLD AUTO: 1.4 K/UL (ref 1–4.8)
LYMPHOCYTES NFR BLD: 19.3 % (ref 18–48)
MCH RBC QN AUTO: 30.5 PG (ref 27–31)
MCHC RBC AUTO-ENTMCNC: 31.7 G/DL (ref 32–36)
MCV RBC AUTO: 96 FL (ref 82–98)
MONOCYTES # BLD AUTO: 0.4 K/UL (ref 0.3–1)
MONOCYTES NFR BLD: 5.8 % (ref 4–15)
NEUTROPHILS # BLD AUTO: 5.2 K/UL (ref 1.8–7.7)
NEUTROPHILS NFR BLD: 73.1 % (ref 38–73)
NRBC BLD-RTO: 0 /100 WBC
PLATELET # BLD AUTO: 278 K/UL (ref 150–450)
PMV BLD AUTO: 11.3 FL (ref 9.2–12.9)
POTASSIUM SERPL-SCNC: 4.3 MMOL/L (ref 3.5–5.1)
PROT SERPL-MCNC: 7.9 G/DL (ref 6–8.4)
RBC # BLD AUTO: 4.69 M/UL (ref 4–5.4)
SODIUM SERPL-SCNC: 141 MMOL/L (ref 136–145)
WBC # BLD AUTO: 7.1 K/UL (ref 3.9–12.7)

## 2023-08-30 LAB — CANCER AG27-29 SERPL-ACNC: 20.6 U/ML

## 2023-10-02 ENCOUNTER — OFFICE VISIT (OUTPATIENT)
Dept: SURGERY | Facility: CLINIC | Age: 83
End: 2023-10-02
Payer: MEDICARE

## 2023-10-02 DIAGNOSIS — Z17.1 MALIGNANT NEOPLASM OF CENTRAL PORTION OF RIGHT BREAST IN FEMALE, ESTROGEN RECEPTOR NEGATIVE: ICD-10-CM

## 2023-10-02 DIAGNOSIS — C78.00 MALIGNANT NEOPLASM METASTATIC TO LUNG, UNSPECIFIED LATERALITY: Primary | ICD-10-CM

## 2023-10-02 DIAGNOSIS — C50.111 MALIGNANT NEOPLASM OF CENTRAL PORTION OF RIGHT BREAST IN FEMALE, ESTROGEN RECEPTOR NEGATIVE: ICD-10-CM

## 2023-10-02 PROCEDURE — 99999 PR PBB SHADOW E&M-EST. PATIENT-LVL I: CPT | Mod: PBBFAC,,, | Performed by: SPECIALIST

## 2023-10-02 PROCEDURE — 99214 PR OFFICE/OUTPT VISIT, EST, LEVL IV, 30-39 MIN: ICD-10-PCS | Mod: S$PBB,,, | Performed by: SPECIALIST

## 2023-10-02 PROCEDURE — 99214 OFFICE O/P EST MOD 30 MIN: CPT | Mod: S$PBB,,, | Performed by: SPECIALIST

## 2023-10-02 PROCEDURE — 99999 PR PBB SHADOW E&M-EST. PATIENT-LVL I: ICD-10-PCS | Mod: PBBFAC,,, | Performed by: SPECIALIST

## 2023-10-02 PROCEDURE — 99211 OFF/OP EST MAY X REQ PHY/QHP: CPT | Mod: PBBFAC,PN | Performed by: SPECIALIST

## 2023-10-02 RX ORDER — HYDROMORPHONE HYDROCHLORIDE 4 MG/1
2 TABLET ORAL EVERY 6 HOURS PRN
Qty: 10 TABLET | Refills: 0 | Status: SHIPPED | OUTPATIENT
Start: 2023-10-02

## 2023-10-02 NOTE — ASSESSMENT & PLAN NOTE
All testing results have been discussed in detail.  We talked about her workup findings and all Specialty recommendations. All R/B/I and alternatives to treatment have been discussed with the patient with respect to all available surgical options. These risks involve (but are not limited to): bleeding, infections, pneumothorax, injury to a major blood vessel, inability to pass catheter, device failure, medication reaction, poor wound healing, etc.   All testing results have been discussed in detail. She appears to be making informed decisions concerning treatment for her breast cancer. We talked about her workup findings and all Specialty recommendations. We will proceed at her convenience.     PLAN:::PORT (VENOUS ACCESS DEVICE) PLACEMENT.

## 2023-10-02 NOTE — PROGRESS NOTES
[facility]  Paul Joiner MD, FACS  Shanel Christiansen, NP-C      Chief Complaint:   Bernadette Longoria is a 83 y.o. female presenting today due to Dr Fuentes recommending port placement.    History of Present Illness:   Mrs. 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 One SLN was negative prior to neoadjuvant chemotherapy. She completed neoadjuvant chemotherapy. 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  Margins for the invasive component were widely negative.  DCIS margins were 0.1 mm superiorly.  ypT2N0. She completed adjuvant radiation.  Unfortunatley, she was found to have a Pulomonary Metastasis in September 2023.  MD::: Paul Villavicencio MD; Paul Mueller MD; Dior Duong MD; Alberto Fuentes MD; MD Eliseo    Past Medical History:   Diagnosis Date    Abnormal chest x-ray 8/28/2023    Anxiety     Cancer with pulmonary metastases 10/2/2023    Hypercholesteremia     Hypertension     Hypothyroidism     Insulin resistance     Malignant neoplasm of central portion of right breast in female, estrogen receptor negative 08/23/2021    Last Assessment & Plan:  Formatting of this note might be different from the original. History & Physical Undergoing neoadjuvant chemotherapy with Dr. Fuentes as noted above.  Her last round was on 11/10/2021 a full dose cyclophosphamide, Taxotere and Keytruda. Discharge Summary   Follow-up Follow-up with Dr. Fuentes.    Osteopenia 12/14/2020    [facility]      Past Surgical History:   Procedure Laterality Date    BREAST BIOPSY Left     BREAST BIOPSY Right     X 2    BREAST LUMPECTOMY Right 08/2021    CARDIAC CATHETERIZATION  05/21/2014    LUMPECTOMY, BREAST Right 01/20/2022    ROTATOR CUFF REPAIR      sentinal node biopsy & port placement  09/02/2021    TOTAL ABDOMINAL HYSTERECTOMY W/ BILATERAL SALPINGOOPHORECTOMY  1983     "    Current Outpatient Medications:     ALPRAZolam (XANAX) 0.25 MG tablet, TAKE ONE TABLET BY MOUTH NIGHTLY AS NEEDED for sleep THANK YOU, Disp: , Rfl:     aspirin (ECOTRIN) 81 MG EC tablet, TAKE ONE TABLET BY MOUTH ONCE DAILY THANK YOU, Disp: , Rfl:     EScitalopram oxalate (LEXAPRO) 10 MG tablet, TAKE ONE TABLET BY MOUTH IN THE MORNING THANK YOU, Disp: , Rfl:     fluticasone propionate (FLONASE) 50 mcg/actuation nasal spray, 1 spray by Each Nostril route every morning., Disp: , Rfl:     furosemide (LASIX) 20 MG tablet, TAKE ONE TABLET BY MOUTH ONCE DAILY AS NEEDED FOR SWELLING THANK YOU, Disp: , Rfl:     gabapentin (NEURONTIN) 300 MG capsule, TAKE ONE CAPSULE BY MOUTH THREE TIMES DAILY THANK YOU, Disp: , Rfl:     levothyroxine (SYNTHROID) 88 MCG tablet, TAKE ONE TABLET BY MOUTH ONCE DAILY THANK YOU, Disp: , Rfl:     lisinopriL (PRINIVIL,ZESTRIL) 2.5 MG tablet, Take 2.5 mg by mouth., Disp: , Rfl:     meloxicam (MOBIC) 7.5 MG tablet, TAKE ONE TABLET BY MOUTH EVERY MORNING WITH FOOD AS NEEDED FOR JOINT STIFFNESS, Disp: , Rfl:     midodrine (PROAMATINE) 2.5 MG Tab, 10 mg., Disp: , Rfl:     midodrine (PROAMATINE) 5 MG Tab, Take by mouth., Disp: , Rfl:     rosuvastatin (CRESTOR) 10 MG tablet, TAKE ONE TABLET BY MOUTH ONCE IN THE EVENING THANK YOU, Disp: , Rfl:     tamsulosin (FLOMAX) 0.4 mg Cap, TAKE ONE CAPSULE BY MOUTH DAILY THANK YOU, Disp: , Rfl:     traMADoL (ULTRAM) 50 mg tablet, TAKE ONE TABLET BY MOUTH TWICE DAILY AS NEEDED FOR PAIN THANK YOU, Disp: , Rfl:     valsartan (DIOVAN) 40 MG tablet, TAKE ONE TABLET BY MOUTH ONCE DAILY THANK YOU, Disp: , Rfl:    Review of patient's allergies indicates:   Allergen Reactions    Prednisone Other (See Comments)     Numbness in Mouth     Hydrocodone-homatropine     Nabumetone     Penicillins      "didn't dissolve under the skin" over 60yrs ago      Propoxyphene Other (See Comments)    Sulfa (sulfonamide antibiotics)       Social History     Tobacco Use    Smoking status: " Never    Smokeless tobacco: Never   Substance Use Topics    Alcohol use: Yes      Family History   Problem Relation Age of Onset    Lung cancer Mother     Diabetes Father         Review of Systems   Integumentary:  Negative for color change, rash, mole/lesion, breast mass, breast discharge and breast tenderness.   Breast: Negative for mass and tenderness       Physical Exam   Constitutional: She is cooperative.   HENT:   Head: Normocephalic.   Pulmonary/Chest: She exhibits no mass. Right breast exhibits no inverted nipple, no mass, no nipple discharge, no skin change and no tenderness. Left breast exhibits no inverted nipple, no mass, no nipple discharge, no skin change and no tenderness.   Abdominal: Normal appearance.   Musculoskeletal: Lymphadenopathy:      Upper Body:      Right upper body: No supraclavicular or axillary adenopathy.      Left upper body: No supraclavicular or axillary adenopathy.     Neurological: She is alert.   Skin: No rash noted.          ASSESSMENT and PLAN of CARE    1. Malignant neoplasm metastatic to lung, unspecified laterality  Assessment & Plan:  All testing results have been discussed in detail.  We talked about her workup findings and all Specialty recommendations. All R/B/I and alternatives to treatment have been discussed with the patient with respect to all available surgical options. These risks involve (but are not limited to): bleeding, infections, pneumothorax, injury to a major blood vessel, inability to pass catheter, device failure, medication reaction, poor wound healing, etc.   All testing results have been discussed in detail. She appears to be making informed decisions concerning treatment for her breast cancer. We talked about her workup findings and all Specialty recommendations. We will proceed at her convenience.     PLAN:::PORT (VENOUS ACCESS DEVICE) PLACEMENT.        2. Malignant neoplasm of central portion of right breast in female, estrogen receptor  negative  Assessment & Plan:  Same as above.        Medical Decision Making:  It is my impression that the patient suffers from all the listed conditions.  Each of these conditions did affect my Plan of Care and all medical decisions that were rendered.  The medical decision making was of high difficulty based on her comorbidities and her previous diagnosis of Triple Negative breast cancer, which can lead to an increased recurrence rate and pose a direct threat to her life.  Laboratory evaluations are currently pending but will be reviewed in the next 24 hours. Any further recommendations will be communicated to the patient by me.  I have reviewed and verified her allergies, list of medications, medical and surgical histories, social history, and a pertinent review of symptoms.    Follow up:  keep appt

## 2023-10-05 ENCOUNTER — OUTSIDE PLACE OF SERVICE (OUTPATIENT)
Dept: SURGERY | Facility: CLINIC | Age: 83
End: 2023-10-05
Payer: MEDICARE

## 2023-10-05 PROCEDURE — 36561 INSERT TUNNELED CV CATH: CPT | Mod: AS,LT,, | Performed by: NURSE PRACTITIONER

## 2023-10-05 PROCEDURE — 77001 FLUOROGUIDE FOR VEIN DEVICE: CPT | Mod: 26,,, | Performed by: SPECIALIST

## 2023-10-05 PROCEDURE — 36561 INSERT TUNNELED CV CATH: CPT | Mod: LT,,, | Performed by: SPECIALIST

## 2023-10-13 ENCOUNTER — NURSE TRIAGE (OUTPATIENT)
Dept: ADMINISTRATIVE | Facility: CLINIC | Age: 83
End: 2023-10-13
Payer: MEDICARE

## 2023-10-13 ENCOUNTER — TELEPHONE (OUTPATIENT)
Dept: SURGERY | Facility: CLINIC | Age: 83
End: 2023-10-13
Payer: MEDICARE

## 2023-10-13 NOTE — TELEPHONE ENCOUNTER
Called pt to schedule an appt about her concerns with the port     ----- Message from Funmi Mckeon sent at 10/13/2023  8:57 AM CDT -----    Patient Returning Call        Who Called:pt  Does the patient know what this is regarding?:been swollen around port for a week please call pt she has talked to on call nurse and they advised her to be seen today  Would the patient rather a call back or a response via [facility]? call  Best Call Back Number:[phone]  Additional Information: call back

## 2023-10-13 NOTE — TELEPHONE ENCOUNTER
Patient states she is s/p port placement for chemotherapy on last Thursday, 10/05/23. Patient states c/o  left neck swelling, sensation of pulling when turning head to the right and a stinging/burning sensation at left neck  post port placement.     Patient advised to See Oncologist, Dr. Alberto Fuentes, Now in office today. Patient transferred to [facility] Scheduling for [facility] Day appointment with Dr. Fuentes. Patient also advised that case encounter will be routed as High Priority for follow up call from the office of Dr. Fuentes today. Patient states understanding of care advice.     Return call made to patient to advise that Oncologist is a [facility] Provider. Patient advised to contact Oncologist directly for same day appointment and that case encounter cannot be forwarded.      Reason for Disposition   Single large node and size > 1 inch (2.5 cm)    Additional Information   Negative: Shock suspected (e.g., cold/pale/clammy skin, too weak to stand, low BP, rapid pulse)   Negative: Similar pain previously and it was from 'heart attack'   Negative: Similar pain previously from 'angina' and not relieved by nitroglycerin   Negative: Difficult to awaken or acting confused (e.g., disoriented, slurred speech)   Negative: Sounds like a life-threatening emergency to the triager   Negative: Followed an injury to neck (e.g., MVA, sports, impact or collision)   Negative: Chest pain   Lymph node in the neck is swollen or painful to the touch   Negative: Sounds like a life-threatening emergency to the triager   Negative: Sore throat is main symptom and has swollen node in the neck that is < 1 inch (2.5 cm) in size   Negative: Node is in the neck and causes difficulty breathing   Negative: Patient sounds very sick or weak to the triager   Negative: Node is in the neck and can't swallow fluids   Negative: Fever > 103 F (39.4 C)   Negative: Lump or swelling in groin and pulsating (like heartbeat)    Protocols used: Neck Pain or  [id number], Lymph Nodes - [id number]

## 2023-10-15 NOTE — PROGRESS NOTES
[facility]  MD Shanel Pritchard, NP-C    Date of Service: 10/16/2023      Chief Complaint:   Bernadette Longoria is a 83 y.o. female presenting today swelling in her port site that she first noticed last Tuesday.  It has since improved and she noticed no swelling on yesterday.    History of Present Illness:   Mrs. 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 One SLN was negative prior to neoadjuvant chemotherapy. She completed neoadjuvant chemotherapy. 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  Margins for the invasive component were widely negative.  DCIS margins were 0.1 mm superiorly.  ypT2N0. She completed adjuvant radiation.  Unfortunatley, she was found to have a Pulomonary Metastasis in September 2023.  MD::: Paul Villavicencio MD; Paul Mueller MD; Dior Duong MD; Alberto Fuentes MD; MD Eliseo    Past Medical History:   Diagnosis Date    Abnormal chest x-ray 8/28/2023    Anxiety     Cancer with pulmonary metastases 10/2/2023    Hypercholesteremia     Hypertension     Hypothyroidism     Insulin resistance     Malignant neoplasm of central portion of right breast in female, estrogen receptor negative 08/23/2021    Last Assessment & Plan:  Formatting of this note might be different from the original. History & Physical Undergoing neoadjuvant chemotherapy with Dr. Fuentes as noted above.  Her last round was on 11/10/2021 a full dose cyclophosphamide, Taxotere and Keytruda. Discharge Summary   Follow-up Follow-up with Dr. Fuentes.    Osteopenia 12/14/2020    [facility]      Past Surgical History:   Procedure Laterality Date    BREAST BIOPSY Left     BREAST BIOPSY Right     X 2    BREAST LUMPECTOMY Right 08/2021    CARDIAC CATHETERIZATION  05/21/2014    LUMPECTOMY, BREAST Right 01/20/2022    ROTATOR CUFF REPAIR      sentinal node  biopsy & port placement  09/02/2021    TOTAL ABDOMINAL HYSTERECTOMY W/ BILATERAL SALPINGOOPHORECTOMY  1983        Current Outpatient Medications:     ALPRAZolam (XANAX) 0.25 MG tablet, TAKE ONE TABLET BY MOUTH NIGHTLY AS NEEDED for sleep THANK YOU, Disp: , Rfl:     aspirin (ECOTRIN) 81 MG EC tablet, TAKE ONE TABLET BY MOUTH ONCE DAILY THANK YOU, Disp: , Rfl:     EScitalopram oxalate (LEXAPRO) 10 MG tablet, TAKE ONE TABLET BY MOUTH IN THE MORNING THANK YOU, Disp: , Rfl:     fluticasone propionate (FLONASE) 50 mcg/actuation nasal spray, 1 spray by Each Nostril route every morning., Disp: , Rfl:     furosemide (LASIX) 20 MG tablet, TAKE ONE TABLET BY MOUTH ONCE DAILY AS NEEDED FOR SWELLING THANK YOU, Disp: , Rfl:     gabapentin (NEURONTIN) 300 MG capsule, TAKE ONE CAPSULE BY MOUTH THREE TIMES DAILY THANK YOU, Disp: , Rfl:     HYDROmorphone (DILAUDID) 4 MG tablet, Take 0.5 tablets (2 mg total) by mouth every 6 (six) hours as needed for Pain., Disp: 10 tablet, Rfl: 0    levothyroxine (SYNTHROID) 88 MCG tablet, TAKE ONE TABLET BY MOUTH ONCE DAILY THANK YOU, Disp: , Rfl:     lisinopriL (PRINIVIL,ZESTRIL) 2.5 MG tablet, Take 2.5 mg by mouth., Disp: , Rfl:     meloxicam (MOBIC) 7.5 MG tablet, TAKE ONE TABLET BY MOUTH EVERY MORNING WITH FOOD AS NEEDED FOR JOINT STIFFNESS, Disp: , Rfl:     midodrine (PROAMATINE) 2.5 MG Tab, 10 mg., Disp: , Rfl:     midodrine (PROAMATINE) 5 MG Tab, Take by mouth., Disp: , Rfl:     rosuvastatin (CRESTOR) 10 MG tablet, TAKE ONE TABLET BY MOUTH ONCE IN THE EVENING THANK YOU, Disp: , Rfl:     tamsulosin (FLOMAX) 0.4 mg Cap, TAKE ONE CAPSULE BY MOUTH DAILY THANK YOU, Disp: , Rfl:     traMADoL (ULTRAM) 50 mg tablet, TAKE ONE TABLET BY MOUTH TWICE DAILY AS NEEDED FOR PAIN THANK YOU, Disp: , Rfl:     valsartan (DIOVAN) 40 MG tablet, TAKE ONE TABLET BY MOUTH ONCE DAILY THANK YOU, Disp: , Rfl:    Review of patient's allergies indicates:   Allergen Reactions    Prednisone Other (See Comments)     Numbness  "in Mouth     Hydrocodone-homatropine     Nabumetone     Penicillins      "didn't dissolve under the skin" over 60yrs ago      Propoxyphene Other (See Comments)    Sulfa (sulfonamide antibiotics)       Social History     Tobacco Use    Smoking status: Never    Smokeless tobacco: Never   Substance Use Topics    Alcohol use: Yes      Family History   Problem Relation Age of Onset    Lung cancer Mother     Diabetes Father         Review of Systems   Integumentary:  Negative for color change, rash, mole/lesion, breast mass, breast discharge and breast tenderness.   Breast: Negative for mass and tenderness       Physical Exam   Constitutional: She is cooperative.   HENT:   Head: Normocephalic.   Pulmonary/Chest: She exhibits no mass. Right breast exhibits no inverted nipple, no mass, no nipple discharge, no skin change and no tenderness. Left breast exhibits no inverted nipple, no mass, no nipple discharge, no skin change and no tenderness.   Left- Port site intact. No swelling or erythema noted. Right- Deferred   Abdominal: Normal appearance.   Musculoskeletal: Lymphadenopathy:      Upper Body:      Right upper body: No supraclavicular or axillary adenopathy.      Left upper body: No supraclavicular or axillary adenopathy.     Neurological: She is alert.   Skin: No rash noted.            Assessment/Plan  1. Malignant neoplasm of central portion of right breast in female, estrogen receptor negative  Assessment & Plan:  She will continue to be followed by NCCN guidelines.      2. Malignant neoplasm metastatic to left lung  Assessment & Plan:  She currently has no swelling to her neck or port site. She will continue as scheduled with her chemotherapy. She will continue her current treatment plan per Dr. Fuentes             Medical Decision Making:  It is my impression that this patient suffers all conditions contained in this medical document.  Each of these conditions did affect our plan of care and my medical decision " making today.  It is my opinion that the medical decision making concerning this patient was of moderate difficulty based on the aforementioned conditions.  Any further recommendations will be communicated to the patient by me.  I have reviewed and verified her allergies, list of medications, medical and surgical histories, social history, and a pertinent review of symptoms.      Follow up:  Keep scheduled appointment and PRN

## 2023-10-15 NOTE — ASSESSMENT & PLAN NOTE
She currently has no swelling to her neck or port site. She will continue as scheduled with her chemotherapy. She will continue her current treatment plan per Dr. Fuentes

## 2023-10-16 ENCOUNTER — OFFICE VISIT (OUTPATIENT)
Dept: SURGERY | Facility: CLINIC | Age: 83
End: 2023-10-16
Payer: MEDICARE

## 2023-10-16 DIAGNOSIS — Z17.1 MALIGNANT NEOPLASM OF CENTRAL PORTION OF RIGHT BREAST IN FEMALE, ESTROGEN RECEPTOR NEGATIVE: Primary | ICD-10-CM

## 2023-10-16 DIAGNOSIS — C50.111 MALIGNANT NEOPLASM OF CENTRAL PORTION OF RIGHT BREAST IN FEMALE, ESTROGEN RECEPTOR NEGATIVE: Primary | ICD-10-CM

## 2023-10-16 DIAGNOSIS — C78.02 MALIGNANT NEOPLASM METASTATIC TO LEFT LUNG: ICD-10-CM

## 2023-10-16 PROCEDURE — 99999 PR PBB SHADOW E&M-EST. PATIENT-LVL II: CPT | Mod: PBBFAC,,, | Performed by: NURSE PRACTITIONER

## 2023-10-16 PROCEDURE — 99024 POSTOP FOLLOW-UP VISIT: CPT | Mod: POP,,, | Performed by: NURSE PRACTITIONER

## 2023-10-16 PROCEDURE — 99212 OFFICE O/P EST SF 10 MIN: CPT | Mod: PBBFAC,PN | Performed by: NURSE PRACTITIONER

## 2023-10-16 PROCEDURE — 99999 PR PBB SHADOW E&M-EST. PATIENT-LVL II: ICD-10-PCS | Mod: PBBFAC,,, | Performed by: NURSE PRACTITIONER

## 2023-10-16 PROCEDURE — 99024 PR POST-OP FOLLOW-UP VISIT: ICD-10-PCS | Mod: POP,,, | Performed by: NURSE PRACTITIONER

## 2024-01-11 NOTE — PROGRESS NOTES
[facility]  Paul Joiner MD, FACS  Shanel Christiansen, LISSETT-C      Date of Service: 1/19/2024    Chief Complaint:   Bernadette Longoria is a 83 y.o. female presenting today for her 3 month follow up to her breast cancer diagnosis.  She is due for a Physical Examination.  She reports no interval changes.     History of Present Illness:   Mrs. 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 One SLN was negative prior to neoadjuvant chemotherapy. She completed neoadjuvant chemotherapy. 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  Margins for the invasive component were widely negative.  DCIS margins were 0.1 mm superiorly.  ypT2N0. She completed adjuvant radiation.  Unfortunatley, she was found to have a Pulomonary Metastasis in September 2023.Palliative Abraxane was initiated on 10/20/2023.  MD::: Paul Villavicencio MD; Paul Mueller MD; Dior Duong MD; Alberto Fuentes MD; MD Eliseo     Past Medical History:   Diagnosis Date    Abnormal chest x-ray 08/28/2023    Anxiety     Arthritis     Breast swelling 01/19/2024    Cancer with pulmonary metastases 10/02/2023    CKD (chronic kidney disease), stage II     Hypercholesteremia     Hyperparathyroidism     Hypertension     Hypothyroidism     Insulin resistance     Malignant neoplasm of central portion of right breast in female, estrogen receptor negative 08/23/2021    Last Assessment & Plan:  Formatting of this note might be different from the original. History & Physical Undergoing neoadjuvant chemotherapy with Dr. Fuentes as noted above.  Her last round was on 11/10/2021 a full dose cyclophosphamide, Taxotere and Keytruda. Discharge Summary   Follow-up Follow-up with Dr. Fuentes.    Nodule of lower lobe of left lung     metastatic carcninoma of the breast    Osteopenia 12/14/2020    [facility]      Past  Surgical History:   Procedure Laterality Date     Right NL lumpectomy 1/20/2022      APPENDECTOMY      BREAST BIOPSY Left     BREAST BIOPSY Right     X 2    CARDIAC CATHETERIZATION  05/21/2014    left lower lobe lung biopsy      right breast sonocore biopsy 8-2021      right SLN biopsy and port placement 9-2-2021      ROTATOR CUFF REPAIR Right     TOTAL ABDOMINAL HYSTERECTOMY W/ BILATERAL SALPINGOOPHORECTOMY  1983    wisdom teeth removal          Current Outpatient Medications:     ALPRAZolam (XANAX) 0.25 MG tablet, TAKE ONE TABLET BY MOUTH NIGHTLY AS NEEDED for sleep THANK YOU, Disp: , Rfl:     aspirin (ECOTRIN) 81 MG EC tablet, TAKE ONE TABLET BY MOUTH ONCE DAILY THANK YOU, Disp: , Rfl:     blood sugar diagnostic (ACCU-CHEK GUIDE TEST STRIPS) Strp, by Other route., Disp: , Rfl:     blood-glucose meter Misc, once daily., Disp: , Rfl:     EScitalopram oxalate (LEXAPRO) 10 MG tablet, TAKE ONE TABLET BY MOUTH IN THE MORNING THANK YOU, Disp: , Rfl:     fluticasone propionate (FLONASE) 50 mcg/actuation nasal spray, 1 spray by Each Nostril route every morning., Disp: , Rfl:     furosemide (LASIX) 20 MG tablet, TAKE ONE TABLET BY MOUTH ONCE DAILY AS NEEDED FOR SWELLING THANK YOU, Disp: , Rfl:     gabapentin (NEURONTIN) 300 MG capsule, TAKE ONE CAPSULE BY MOUTH THREE TIMES DAILY THANK YOU, Disp: , Rfl:     HYDROmorphone (DILAUDID) 4 MG tablet, Take 0.5 tablets (2 mg total) by mouth every 6 (six) hours as needed for Pain., Disp: 10 tablet, Rfl: 0    levothyroxine (SYNTHROID) 88 MCG tablet, TAKE ONE TABLET BY MOUTH ONCE DAILY THANK YOU, Disp: , Rfl:     lisinopriL (PRINIVIL,ZESTRIL) 2.5 MG tablet, Take 2.5 mg by mouth., Disp: , Rfl:     meloxicam (MOBIC) 7.5 MG tablet, TAKE ONE TABLET BY MOUTH EVERY MORNING WITH FOOD AS NEEDED FOR JOINT STIFFNESS, Disp: , Rfl:     midodrine (PROAMATINE) 2.5 MG Tab, 10 mg., Disp: , Rfl:     midodrine (PROAMATINE) 5 MG Tab, Take by mouth., Disp: , Rfl:     rosuvastatin (CRESTOR) 10 MG tablet,  "TAKE ONE TABLET BY MOUTH ONCE IN THE EVENING THANK YOU, Disp: , Rfl:     tamsulosin (FLOMAX) 0.4 mg Cap, TAKE ONE CAPSULE BY MOUTH DAILY THANK YOU, Disp: , Rfl:     traMADoL (ULTRAM) 50 mg tablet, TAKE ONE TABLET BY MOUTH TWICE DAILY AS NEEDED FOR PAIN THANK YOU, Disp: , Rfl:     valsartan (DIOVAN) 40 MG tablet, TAKE ONE TABLET BY MOUTH ONCE DAILY THANK YOU, Disp: , Rfl:    Review of patient's allergies indicates:   Allergen Reactions    Prednisone Other (See Comments)     Numbness in Mouth     Hydrocodone-homatropine     Nabumetone     Penicillins      "didn't dissolve under the skin" over 60yrs ago      Propoxyphene Other (See Comments)    Sulfa (sulfonamide antibiotics)       Social History     Tobacco Use    Smoking status: Never    Smokeless tobacco: Never   Substance Use Topics    Alcohol use: Yes      Family History   Problem Relation Age of Onset    Lung cancer Mother     Diabetes Father     Breast cancer Neg Hx     Ovarian cancer Neg Hx         Review of Systems   Integumentary:  Negative for color change, rash, mole/lesion, breast mass, breast discharge and breast tenderness.   Breast: Negative for mass and tenderness       Physical Exam   Constitutional: She is cooperative.   HENT:   Head: Normocephalic.   Pulmonary/Chest: She exhibits no mass. Right breast exhibits no inverted nipple, no mass, no nipple discharge, no skin change and no tenderness. Left breast exhibits no inverted nipple, no mass, no nipple discharge, no skin change and no tenderness. There is breast swelling (generalized edema).   Abdominal: Normal appearance.   Genitourinary: There is breast swelling (generalized edema).   Musculoskeletal: Lymphadenopathy:      Upper Body:      Right upper body: No supraclavicular or axillary adenopathy.      Left upper body: No supraclavicular or axillary adenopathy.     Neurological: She is alert.   Skin: No rash noted.          ASSESSMENT and PLAN OF CARE     1. Malignant neoplasm of central portion " of right breast in female, estrogen receptor negative  Assessment & Plan:  She is doing well and will continue to be followed according to NCCN Guidelines. She understands that her exams have remained stable and is comfortable being followed in a conservative fashion. She understands the importance of monthly self-breast examination and knows to report any and all changes as they occur.          2. Breast swelling  Assessment & Plan:  She hasn't been wearing a bra. I encouraged her to wear a tight bra and to massage the breast twice a day like the arms of a clock away from the nipple to help with the swelling and she verbalized understanding.       3. Malignant neoplasm metastatic to left lung  Assessment & Plan:   She will continue her current treatment plan per Dr. Fuentes      Medical Decision Making: It is my impression that this patient suffers all conditions contained in this medical document.  Each of these conditions did affect our plan of care and my medical decision making today.  It is my opinion that the medical decision making concerning this patient was of moderate difficulty based on the aforementioned conditions.  Any further recommendations will be communicated to the patient by me.  I have reviewed and verified her allergies, list of medications, medical and surgical histories, social history, and a pertinent review of symptoms.    Follow up:  3 Months and PRN     For:Physical Examination

## 2024-01-19 ENCOUNTER — OFFICE VISIT (OUTPATIENT)
Dept: SURGERY | Facility: CLINIC | Age: 84
End: 2024-01-19
Payer: MEDICARE

## 2024-01-19 DIAGNOSIS — C78.02 MALIGNANT NEOPLASM METASTATIC TO LEFT LUNG: ICD-10-CM

## 2024-01-19 DIAGNOSIS — C50.111 MALIGNANT NEOPLASM OF CENTRAL PORTION OF RIGHT BREAST IN FEMALE, ESTROGEN RECEPTOR NEGATIVE: Primary | ICD-10-CM

## 2024-01-19 DIAGNOSIS — N63.0 BREAST SWELLING: ICD-10-CM

## 2024-01-19 DIAGNOSIS — Z17.1 MALIGNANT NEOPLASM OF CENTRAL PORTION OF RIGHT BREAST IN FEMALE, ESTROGEN RECEPTOR NEGATIVE: Primary | ICD-10-CM

## 2024-01-19 PROCEDURE — 99214 OFFICE O/P EST MOD 30 MIN: CPT | Mod: S$PBB,,, | Performed by: NURSE PRACTITIONER

## 2024-01-19 PROCEDURE — 99999 PR PBB SHADOW E&M-EST. PATIENT-LVL III: CPT | Mod: PBBFAC,,, | Performed by: NURSE PRACTITIONER

## 2024-01-19 PROCEDURE — 99213 OFFICE O/P EST LOW 20 MIN: CPT | Mod: PBBFAC,PN | Performed by: NURSE PRACTITIONER

## 2024-01-19 RX ORDER — DEXTROSE 4 G
TABLET,CHEWABLE ORAL DAILY
COMMUNITY
Start: 2023-11-07

## 2024-01-19 NOTE — ASSESSMENT & PLAN NOTE
She hasn't been wearing a bra. I encouraged her to wear a tight bra and to massage the breast twice a day like the arms of a clock away from the nipple to help with the swelling and she verbalized understanding.

## 2024-01-19 NOTE — ASSESSMENT & PLAN NOTE
She is doing well and will continue to be followed according to NCCN Guidelines. She understands that her exams have remained stable and is comfortable being followed in a conservative fashion. She understands the importance of monthly self-breast examination and knows to report any and all changes as they occur.

## 2024-04-17 PROBLEM — C78.02 MALIGNANT NEOPLASM METASTATIC TO LEFT LUNG: Status: ACTIVE | Noted: 2023-10-02

## 2024-04-17 NOTE — ASSESSMENT & PLAN NOTE
She is doing well and will continue to be followed according to NCCN Guidelines. She understands that her exams have remained stable and is comfortable being followed in a conservative fashion. She understands the importance of monthly self-breast examination and knows to report any and all changes as they occur.    Labs obtained today: CBC, Chem 12, CEA, LDH, CA 27.29 - after resulted, these will be compared to her previous values and all abnormal values will be phoned to her for discussion.

## 2024-04-17 NOTE — PROGRESS NOTES
[facility]  Paul Joiner MD, FACS  Shanel Christiansen NP-C      Date of Service: 4/19/2024    Chief Complaint:   Bernadette Longoria is a 83 y.o. female presenting today for her 3 month follow up to her breast cancer diagnosis.  She is due for a Physical Examination.  She reports no interval changes.     History of Present Illness:   Mrs. 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 One SLN was negative prior to neoadjuvant chemotherapy. She completed neoadjuvant chemotherapy. 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  Margins for the invasive component were widely negative.  DCIS margins were 0.1 mm superiorly.  [id number]. She completed adjuvant radiation.  Unfortunatley, she was found to have a Pulomonary Metastasis in September 2023.Palliative Abraxane was initiated on 10/20/2023.  MD::: Paul Villavicencio MD; Paul Mueller MD; Dior Duong MD; Alberto Fuentes MD; MD Eliseo     Past Medical History:   Diagnosis Date    Abnormal chest x-ray 08/28/2023    Anxiety     Arthritis     Breast swelling 01/19/2024    Cancer with pulmonary metastases 10/02/2023    CKD (chronic kidney disease), stage II     Hypercholesteremia     Hyperparathyroidism     Hypertension     Hypothyroidism     Insulin resistance     Malignant neoplasm metastatic to left lung 10/02/2023    Malignant neoplasm of central portion of right breast in female, estrogen receptor negative 08/23/2021    Last Assessment & Plan:  Formatting of this note might be different from the original. History & Physical Undergoing neoadjuvant chemotherapy with Dr. Fuentes as noted above.  Her last round was on 11/10/2021 a full dose cyclophosphamide, Taxotere and Keytruda. Discharge Summary   Follow-up Follow-up with Dr. Fuentes.    Nodule of lower lobe of left lung     metastatic carcninoma of the  breast    Osteopenia 12/14/2020    [facility]      Past Surgical History:   Procedure Laterality Date     Right NL lumpectomy 1/20/2022      APPENDECTOMY      BREAST BIOPSY Left     BREAST BIOPSY Right     X 2    CARDIAC CATHETERIZATION  05/21/2014    left lower lobe lung biopsy      right breast sonocore biopsy 8-2021      right SLN biopsy and port placement 9-2-2021      ROTATOR CUFF REPAIR Right     TOTAL ABDOMINAL HYSTERECTOMY W/ BILATERAL SALPINGOOPHORECTOMY  1983    wisdom teeth removal          Current Outpatient Medications:     ALPRAZolam (XANAX) 0.25 MG tablet, TAKE ONE TABLET BY MOUTH NIGHTLY AS NEEDED for sleep THANK YOU, Disp: , Rfl:     aspirin (ECOTRIN) 81 MG EC tablet, TAKE ONE TABLET BY MOUTH ONCE DAILY THANK YOU, Disp: , Rfl:     blood sugar diagnostic (ACCU-CHEK GUIDE TEST STRIPS) Strp, by Other route., Disp: , Rfl:     blood-glucose meter Misc, once daily., Disp: , Rfl:     EScitalopram oxalate (LEXAPRO) 10 MG tablet, TAKE ONE TABLET BY MOUTH IN THE MORNING THANK YOU, Disp: , Rfl:     fluticasone propionate (FLONASE) 50 mcg/actuation nasal spray, 1 spray by Each Nostril route every morning., Disp: , Rfl:     furosemide (LASIX) 20 MG tablet, TAKE ONE TABLET BY MOUTH ONCE DAILY AS NEEDED FOR SWELLING THANK YOU, Disp: , Rfl:     gabapentin (NEURONTIN) 300 MG capsule, TAKE ONE CAPSULE BY MOUTH THREE TIMES DAILY THANK YOU, Disp: , Rfl:     HYDROmorphone (DILAUDID) 4 MG tablet, Take 0.5 tablets (2 mg total) by mouth every 6 (six) hours as needed for Pain., Disp: 10 tablet, Rfl: 0    levothyroxine (SYNTHROID) 88 MCG tablet, TAKE ONE TABLET BY MOUTH ONCE DAILY THANK YOU, Disp: , Rfl:     lisinopriL (PRINIVIL,ZESTRIL) 2.5 MG tablet, Take 2.5 mg by mouth., Disp: , Rfl:     meloxicam (MOBIC) 7.5 MG tablet, TAKE ONE TABLET BY MOUTH EVERY MORNING WITH FOOD AS NEEDED FOR JOINT STIFFNESS, Disp: , Rfl:     midodrine (PROAMATINE) 2.5 MG Tab, 10 mg., Disp: , Rfl:     midodrine (PROAMATINE) 5 MG Tab, Take by mouth.,  "Disp: , Rfl:     rosuvastatin (CRESTOR) 10 MG tablet, TAKE ONE TABLET BY MOUTH ONCE IN THE EVENING THANK YOU, Disp: , Rfl:     tamsulosin (FLOMAX) 0.4 mg Cap, TAKE ONE CAPSULE BY MOUTH DAILY THANK YOU, Disp: , Rfl:     traMADoL (ULTRAM) 50 mg tablet, TAKE ONE TABLET BY MOUTH TWICE DAILY AS NEEDED FOR PAIN THANK YOU, Disp: , Rfl:     valsartan (DIOVAN) 40 MG tablet, TAKE ONE TABLET BY MOUTH ONCE DAILY THANK YOU, Disp: , Rfl:    Review of patient's allergies indicates:   Allergen Reactions    Prednisone Other (See Comments)     Numbness in Mouth     Hydrocodone-homatropine     Nabumetone     Penicillins      "didn't dissolve under the skin" over 60yrs ago      Propoxyphene Other (See Comments)    Sulfa (sulfonamide antibiotics)       Social History     Tobacco Use    Smoking status: Never    Smokeless tobacco: Never   Substance Use Topics    Alcohol use: Yes      Family History   Problem Relation Name Age of Onset    Lung cancer Mother      Diabetes Father      Breast cancer Neg Hx      Ovarian cancer Neg Hx          Review of Systems   Integumentary:  Negative for color change, rash, mole/lesion, breast mass, breast discharge and breast tenderness.   Breast: Negative for mass and tenderness       Physical Exam   Constitutional: She is cooperative.   HENT:   Head: Normocephalic.   Pulmonary/Chest: She exhibits no mass. Right breast exhibits no inverted nipple, no mass, no nipple discharge, no skin change and no tenderness. Left breast exhibits no inverted nipple, no mass, no nipple discharge, no skin change and no tenderness. There is breast swelling (generalized edema).   Abdominal: Normal appearance.   Genitourinary: There is breast swelling (generalized edema).   Musculoskeletal: Lymphadenopathy:      Upper Body:      Right upper body: No supraclavicular or axillary adenopathy.      Left upper body: No supraclavicular or axillary adenopathy.     Neurological: She is alert.   Skin: No rash noted.          ASSESSMENT " and PLAN OF CARE     1. Malignant neoplasm of central portion of right breast in female, estrogen receptor negative  Assessment & Plan:  She is doing well and will continue to be followed according to NCCN Guidelines. She understands that her exams have remained stable and is comfortable being followed in a conservative fashion. She understands the importance of monthly self-breast examination and knows to report any and all changes as they occur.    Labs obtained today: CBC, Chem 12, CEA, LDH, CA 27.29 - after resulted, these will be compared to her previous values and all abnormal values will be phoned to her for discussion.        2. Malignant neoplasm metastatic to left lung  Assessment & Plan:   She will continue her current treatment plan per Dr. Fuentes      3. Breast swelling  Assessment & Plan:  Her symptoms have remained stable..         Medical Decision Making: It is my impression that this patient suffers all conditions contained in this medical document.  Each of these conditions did affect our plan of care and my medical decision making today.  It is my opinion that the medical decision making concerning this patient was of moderate difficulty based on the aforementioned conditions.  Any further recommendations will be communicated to the patient by me.  I have reviewed and verified her allergies, list of medications, medical and surgical histories, social history, and a pertinent review of symptoms.    Follow up:  3 Months and PRN     For:Physical Examination

## 2024-04-19 ENCOUNTER — OFFICE VISIT (OUTPATIENT)
Dept: SURGERY | Facility: CLINIC | Age: 84
End: 2024-04-19
Payer: MEDICARE

## 2024-04-19 DIAGNOSIS — N63.0 BREAST SWELLING: ICD-10-CM

## 2024-04-19 DIAGNOSIS — C78.02 MALIGNANT NEOPLASM METASTATIC TO LEFT LUNG: ICD-10-CM

## 2024-04-19 DIAGNOSIS — Z17.1 MALIGNANT NEOPLASM OF CENTRAL PORTION OF RIGHT BREAST IN FEMALE, ESTROGEN RECEPTOR NEGATIVE: Primary | ICD-10-CM

## 2024-04-19 DIAGNOSIS — C50.111 MALIGNANT NEOPLASM OF CENTRAL PORTION OF RIGHT BREAST IN FEMALE, ESTROGEN RECEPTOR NEGATIVE: Primary | ICD-10-CM

## 2024-04-19 PROCEDURE — 99214 OFFICE O/P EST MOD 30 MIN: CPT | Mod: S$PBB,,, | Performed by: NURSE PRACTITIONER

## 2024-04-19 PROCEDURE — 99999 PR PBB SHADOW E&M-EST. PATIENT-LVL III: CPT | Mod: PBBFAC,,, | Performed by: NURSE PRACTITIONER

## 2024-04-19 PROCEDURE — 99213 OFFICE O/P EST LOW 20 MIN: CPT | Mod: PBBFAC,PN | Performed by: NURSE PRACTITIONER

## 2024-10-21 DIAGNOSIS — Z17.1 MALIGNANT NEOPLASM OF CENTRAL PORTION OF RIGHT BREAST IN FEMALE, ESTROGEN RECEPTOR NEGATIVE: Primary | ICD-10-CM

## 2024-10-21 DIAGNOSIS — C50.111 MALIGNANT NEOPLASM OF CENTRAL PORTION OF RIGHT BREAST IN FEMALE, ESTROGEN RECEPTOR NEGATIVE: Primary | ICD-10-CM

## 2024-10-30 ENCOUNTER — OFFICE VISIT (OUTPATIENT)
Dept: SURGERY | Facility: CLINIC | Age: 84
End: 2024-10-30
Payer: MEDICARE

## 2024-10-30 DIAGNOSIS — C50.111 MALIGNANT NEOPLASM OF CENTRAL PORTION OF RIGHT BREAST IN FEMALE, ESTROGEN RECEPTOR NEGATIVE: Primary | ICD-10-CM

## 2024-10-30 DIAGNOSIS — C78.02 MALIGNANT NEOPLASM METASTATIC TO LEFT LUNG: ICD-10-CM

## 2024-10-30 DIAGNOSIS — Z17.1 MALIGNANT NEOPLASM OF CENTRAL PORTION OF RIGHT BREAST IN FEMALE, ESTROGEN RECEPTOR NEGATIVE: Primary | ICD-10-CM

## 2024-10-30 DIAGNOSIS — N63.0 BREAST SWELLING: ICD-10-CM

## 2024-10-30 PROCEDURE — 99999 PR PBB SHADOW E&M-EST. PATIENT-LVL III: CPT | Mod: PBBFAC,,, | Performed by: NURSE PRACTITIONER

## 2024-10-30 PROCEDURE — 99213 OFFICE O/P EST LOW 20 MIN: CPT | Mod: PBBFAC,PN | Performed by: NURSE PRACTITIONER
